# Patient Record
Sex: FEMALE | Race: WHITE | NOT HISPANIC OR LATINO | Employment: UNEMPLOYED | URBAN - METROPOLITAN AREA
[De-identification: names, ages, dates, MRNs, and addresses within clinical notes are randomized per-mention and may not be internally consistent; named-entity substitution may affect disease eponyms.]

---

## 2024-08-23 ENCOUNTER — HOSPITAL ENCOUNTER (EMERGENCY)
Facility: HOSPITAL | Age: 49
Discharge: HOME | End: 2024-08-23
Attending: STUDENT IN AN ORGANIZED HEALTH CARE EDUCATION/TRAINING PROGRAM
Payer: COMMERCIAL

## 2024-08-23 ENCOUNTER — APPOINTMENT (OUTPATIENT)
Dept: RADIOLOGY | Facility: HOSPITAL | Age: 49
End: 2024-08-23
Payer: COMMERCIAL

## 2024-08-23 ENCOUNTER — APPOINTMENT (OUTPATIENT)
Dept: CARDIOLOGY | Facility: HOSPITAL | Age: 49
End: 2024-08-23
Payer: COMMERCIAL

## 2024-08-23 VITALS
OXYGEN SATURATION: 96 % | RESPIRATION RATE: 16 BRPM | WEIGHT: 180 LBS | SYSTOLIC BLOOD PRESSURE: 112 MMHG | HEIGHT: 66 IN | BODY MASS INDEX: 28.93 KG/M2 | HEART RATE: 74 BPM | TEMPERATURE: 98.4 F | DIASTOLIC BLOOD PRESSURE: 67 MMHG

## 2024-08-23 DIAGNOSIS — R06.02 SHORTNESS OF BREATH: ICD-10-CM

## 2024-08-23 DIAGNOSIS — E04.1 THYROID NODULE: ICD-10-CM

## 2024-08-23 DIAGNOSIS — R00.2 PALPITATIONS: Primary | ICD-10-CM

## 2024-08-23 LAB
ALBUMIN SERPL BCP-MCNC: 4.4 G/DL (ref 3.4–5)
ALP SERPL-CCNC: 68 U/L (ref 33–110)
ALT SERPL W P-5'-P-CCNC: 15 U/L (ref 7–45)
ANION GAP SERPL CALC-SCNC: 14 MMOL/L (ref 10–20)
AST SERPL W P-5'-P-CCNC: 17 U/L (ref 9–39)
BASOPHILS # BLD AUTO: 0.03 X10*3/UL (ref 0–0.1)
BASOPHILS NFR BLD AUTO: 0.4 %
BILIRUB SERPL-MCNC: 1.1 MG/DL (ref 0–1.2)
BUN SERPL-MCNC: 18 MG/DL (ref 6–23)
CALCIUM SERPL-MCNC: 8.9 MG/DL (ref 8.6–10.3)
CARDIAC TROPONIN I PNL SERPL HS: <3 NG/L (ref 0–13)
CARDIAC TROPONIN I PNL SERPL HS: <3 NG/L (ref 0–13)
CHLORIDE SERPL-SCNC: 99 MMOL/L (ref 98–107)
CO2 SERPL-SCNC: 28 MMOL/L (ref 21–32)
CREAT SERPL-MCNC: 0.86 MG/DL (ref 0.5–1.05)
EGFRCR SERPLBLD CKD-EPI 2021: 83 ML/MIN/1.73M*2
EOSINOPHIL # BLD AUTO: 0.1 X10*3/UL (ref 0–0.7)
EOSINOPHIL NFR BLD AUTO: 1.4 %
ERYTHROCYTE [DISTWIDTH] IN BLOOD BY AUTOMATED COUNT: 11.9 % (ref 11.5–14.5)
GLUCOSE SERPL-MCNC: 125 MG/DL (ref 74–99)
HCT VFR BLD AUTO: 44.6 % (ref 36–46)
HGB BLD-MCNC: 14.8 G/DL (ref 12–16)
IMM GRANULOCYTES # BLD AUTO: 0.02 X10*3/UL (ref 0–0.7)
IMM GRANULOCYTES NFR BLD AUTO: 0.3 % (ref 0–0.9)
LIPASE SERPL-CCNC: 40 U/L (ref 9–82)
LYMPHOCYTES # BLD AUTO: 1.54 X10*3/UL (ref 1.2–4.8)
LYMPHOCYTES NFR BLD AUTO: 21.6 %
MAGNESIUM SERPL-MCNC: 1.9 MG/DL (ref 1.6–2.4)
MCH RBC QN AUTO: 30.5 PG (ref 26–34)
MCHC RBC AUTO-ENTMCNC: 33.2 G/DL (ref 32–36)
MCV RBC AUTO: 92 FL (ref 80–100)
MONOCYTES # BLD AUTO: 0.48 X10*3/UL (ref 0.1–1)
MONOCYTES NFR BLD AUTO: 6.7 %
NEUTROPHILS # BLD AUTO: 4.97 X10*3/UL (ref 1.2–7.7)
NEUTROPHILS NFR BLD AUTO: 69.6 %
NRBC BLD-RTO: 0 /100 WBCS (ref 0–0)
PLATELET # BLD AUTO: 221 X10*3/UL (ref 150–450)
POTASSIUM SERPL-SCNC: 3.7 MMOL/L (ref 3.5–5.3)
PROT SERPL-MCNC: 7.3 G/DL (ref 6.4–8.2)
RBC # BLD AUTO: 4.85 X10*6/UL (ref 4–5.2)
SODIUM SERPL-SCNC: 137 MMOL/L (ref 136–145)
TSH SERPL-ACNC: 3.01 MIU/L (ref 0.44–3.98)
WBC # BLD AUTO: 7.1 X10*3/UL (ref 4.4–11.3)

## 2024-08-23 PROCEDURE — 82947 ASSAY GLUCOSE BLOOD QUANT: CPT

## 2024-08-23 PROCEDURE — 83690 ASSAY OF LIPASE: CPT

## 2024-08-23 PROCEDURE — 2550000001 HC RX 255 CONTRASTS: Performed by: STUDENT IN AN ORGANIZED HEALTH CARE EDUCATION/TRAINING PROGRAM

## 2024-08-23 PROCEDURE — 71045 X-RAY EXAM CHEST 1 VIEW: CPT | Performed by: RADIOLOGY

## 2024-08-23 PROCEDURE — 71045 X-RAY EXAM CHEST 1 VIEW: CPT

## 2024-08-23 PROCEDURE — 93005 ELECTROCARDIOGRAM TRACING: CPT

## 2024-08-23 PROCEDURE — 84443 ASSAY THYROID STIM HORMONE: CPT

## 2024-08-23 PROCEDURE — 84484 ASSAY OF TROPONIN QUANT: CPT

## 2024-08-23 PROCEDURE — 99285 EMERGENCY DEPT VISIT HI MDM: CPT | Mod: 25

## 2024-08-23 PROCEDURE — 71275 CT ANGIOGRAPHY CHEST: CPT

## 2024-08-23 PROCEDURE — 71275 CT ANGIOGRAPHY CHEST: CPT | Performed by: RADIOLOGY

## 2024-08-23 PROCEDURE — 83735 ASSAY OF MAGNESIUM: CPT

## 2024-08-23 PROCEDURE — 85025 COMPLETE CBC W/AUTO DIFF WBC: CPT

## 2024-08-23 PROCEDURE — 36415 COLL VENOUS BLD VENIPUNCTURE: CPT

## 2024-08-23 RX ADMIN — IOHEXOL 50 ML: 350 INJECTION, SOLUTION INTRAVENOUS at 20:05

## 2024-08-23 ASSESSMENT — PAIN - FUNCTIONAL ASSESSMENT: PAIN_FUNCTIONAL_ASSESSMENT: 0-10

## 2024-08-23 ASSESSMENT — COLUMBIA-SUICIDE SEVERITY RATING SCALE - C-SSRS
1. IN THE PAST MONTH, HAVE YOU WISHED YOU WERE DEAD OR WISHED YOU COULD GO TO SLEEP AND NOT WAKE UP?: NO
2. HAVE YOU ACTUALLY HAD ANY THOUGHTS OF KILLING YOURSELF?: NO
6. HAVE YOU EVER DONE ANYTHING, STARTED TO DO ANYTHING, OR PREPARED TO DO ANYTHING TO END YOUR LIFE?: NO

## 2024-08-23 ASSESSMENT — PAIN DESCRIPTION - DESCRIPTORS: DESCRIPTORS: BURNING;PRESSURE

## 2024-08-24 NOTE — DISCHARGE INSTRUCTIONS
You are seen today due to concerns of palpitations with associated chest pressure and shortness of breath.  Your CT PE scan was negative and lab work looks unremarkable.  Please continue to monitor heart rate and follow-up with your PCP.

## 2024-08-24 NOTE — ED PROVIDER NOTES
CC: Chest Pain     HPI:   Patient is a previous healthy 49-year-old female presenting due to concerns of palpitations for the past few days.  Patient has not had any increase in caffeine intake does not have any thyroid problems that she is aware of.  She reports that her heart rate monitor has been showing sinus tachycardia with a rate between 110 and 130s consistently.  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  She denies any medications that she takes daily.  She does not smoke or drink.  She has no history of DVTs or PEs.  She feels shortness of breath associated with her palpitations and pressure on her chest bilaterally.  Patient has a heart rate in the 90s persistently overall initially being evaluated.  She is satting well on room air and is not tachypneic.    Limitations to History: none  Additional History Obtained from: patient alone    PMHx/PSHx:  Per HPI.   - has no past medical history on file.  - has a past surgical history that includes Other surgical history (03/06/2019); Other surgical history (03/06/2019); and Other surgical history (10/29/2019).    Social History:  - Tobacco:  has no history on file for tobacco use.   - Alcohol:  has no history on file for alcohol use.   - Drugs:  has no history on file for drug use.     Medications: Reviewed in EMR.     Allergies:  Penicillins    ???????????????????????????????????????????????????????????????  Triage Vitals:  T 36.9 °C (98.4 °F)  HR 93  /82  RR 18  O2 98 % None (Room air)    Physical Exam  Vitals and nursing note reviewed.   Constitutional:       General: She is not in acute distress.     Appearance: She is well-developed.   HENT:      Head: Normocephalic and atraumatic.      Mouth/Throat:      Mouth: Mucous membranes are dry.      Pharynx: Oropharynx is clear.   Eyes:      Conjunctiva/sclera: Conjunctivae normal.   Cardiovascular:      Rate and Rhythm: Regular rhythm. Tachycardia  present.      Heart sounds: No murmur heard.  Pulmonary:      Effort: Pulmonary effort is normal. No respiratory distress.      Breath sounds: Normal breath sounds. No wheezing, rhonchi or rales.   Abdominal:      General: There is no distension.      Palpations: Abdomen is soft.      Tenderness: There is no abdominal tenderness. There is no guarding or rebound.   Musculoskeletal:         General: No swelling or tenderness.      Cervical back: Neck supple.   Skin:     General: Skin is warm and dry.      Capillary Refill: Capillary refill takes less than 2 seconds.   Neurological:      Mental Status: She is alert and oriented to person, place, and time.      Sensory: No sensory deficit.      Motor: No weakness.      Gait: Gait normal.   Psychiatric:         Mood and Affect: Mood normal.       ???????????????????????????????????????????????????????????????  EKG (per my interpretation): Sinus rhythm with a rate of 81.  No MA QRS punctation.  No acute ST or T wave changes noted.  No NINOSKA.    ED Course  ED Course as of 08/23/24 2048   Fri Aug 23, 2024   1932 This is a 49-year-old presenting with chief complaint of chest pain, short of breath. [WL]   1932 EKG interpreted by me as normal sinus rhythm, low voltage QRS, nonselective abnormalities.  No STEMI.  Rate 81 bpm.  Compared to prior EKG similar findings present [WL]   2048 CT PE negative   [RR]      ED Course User Index  [RR] Bhavna Elizabeth MD  [WL] Sergio Marquez DO       Medical Decision Making:  Patient is a previously healthy 41-year-old female presenting due to concern for palpitations and shortness of breath.  Differentials considered but not limited to electrolyte abnormality, anemia, ACS,  PE, pneumothorax, thyroid malfunction, pancreatitis.    Based on patient's history and physical examination basic lab work, chest x-ray and CT PE were obtained.  Suspect patient's tachycardia is likely anxiety.  She will be referred to cardiology and PCP.  At this time  she is low risk and will be discharged.  Patient feels comfortable with this plan was discharged in hemodynamically stable condition.  Patient care was overseen by attending physician agrees with the plan and disposition.    External records reviewed: recent inpatient, clinic, and prior ED notes  Diagnostic imaging independently reviewed/interpreted by me (as reflected in MDM) includes: CT PE, CXR  Social Determinants Affecting Care: None identified  Discussion of management with other providers: attending  Prescription Drug Consideration: none  Escalation of Care: none    Impression:   Palpitations  Tachycardia  SOB    Disposition: Discharge      Procedures ? SmartLinks last updated 8/23/2024 8:48 PM     Bhavna Elizabeth  PGY-2 Emergency Medicine  [facility]     Bhavna Elizabeth MD  Resident  08/23/24 2042

## 2024-08-26 LAB
ATRIAL RATE: 81 BPM
P AXIS: 71 DEGREES
P OFFSET: 198 MS
P ONSET: 142 MS
PR INTERVAL: 156 MS
Q ONSET: 220 MS
QRS COUNT: 13 BEATS
QRS DURATION: 86 MS
QT INTERVAL: 380 MS
QTC CALCULATION(BAZETT): 441 MS
QTC FREDERICIA: 419 MS
R AXIS: 37 DEGREES
T AXIS: 73 DEGREES
T OFFSET: 410 MS
VENTRICULAR RATE: 81 BPM

## 2024-08-28 ENCOUNTER — OFFICE VISIT (OUTPATIENT)
Dept: CARDIOLOGY | Facility: HOSPITAL | Age: 49
End: 2024-08-28
Payer: COMMERCIAL

## 2024-08-28 VITALS
SYSTOLIC BLOOD PRESSURE: 105 MMHG | BODY MASS INDEX: 27.36 KG/M2 | WEIGHT: 169.53 LBS | DIASTOLIC BLOOD PRESSURE: 69 MMHG | OXYGEN SATURATION: 98 % | HEART RATE: 73 BPM

## 2024-08-28 DIAGNOSIS — I34.1 MITRAL VALVE ANTERIOR LEAFLET PROLAPSE: ICD-10-CM

## 2024-08-28 DIAGNOSIS — R00.2 PALPITATIONS: Primary | ICD-10-CM

## 2024-08-28 PROCEDURE — 99203 OFFICE O/P NEW LOW 30 MIN: CPT | Performed by: NURSE PRACTITIONER

## 2024-08-28 PROCEDURE — 99213 OFFICE O/P EST LOW 20 MIN: CPT | Performed by: NURSE PRACTITIONER

## 2024-08-28 PROCEDURE — 1036F TOBACCO NON-USER: CPT | Performed by: NURSE PRACTITIONER

## 2024-08-28 ASSESSMENT — ENCOUNTER SYMPTOMS
MYALGIAS: 1
EYES NEGATIVE: 1
HEMATOLOGIC/LYMPHATIC NEGATIVE: 1
ENDOCRINE NEGATIVE: 1
CONSTITUTIONAL NEGATIVE: 1
PSYCHIATRIC NEGATIVE: 1
PALPITATIONS: 1
GASTROINTESTINAL NEGATIVE: 1
RESPIRATORY NEGATIVE: 1
NEUROLOGICAL NEGATIVE: 1

## 2024-08-28 NOTE — PROGRESS NOTES
Referred by Dr. Marquez for Palpitations     History Of Present Illness:    Dear Dr. Guadalupe,     I had the pleasure of meeting Mrs. Shaw today at [facility] for evaluation of heart palpitations. The patient is seen in collaboration with Dr. Trore. Mrs. Shaw is a very pleasant 49 year old female without significant medical history, denies history of smoking. Family history of mitral valve prolapse. Recently in the ER with heart palpitations. She states her heart rate was elevated and was not coming back to normal. During that time had felt hot. She is under stress at home caring for her , states the stress has not changed. States she does not sleep well at night.     Review of Systems   Constitutional: Negative.   HENT: Negative.     Eyes: Negative.    Cardiovascular:  Positive for palpitations.   Respiratory: Negative.     Endocrine: Negative.    Hematologic/Lymphatic: Negative.    Skin: Negative.    Musculoskeletal:  Positive for myalgias.   Gastrointestinal: Negative.    Neurological: Negative.    Psychiatric/Behavioral: Negative.          Past Medical History:  She has no past medical history on file.    Past Surgical History:  She has a past surgical history that includes Other surgical history (03/06/2019); Other surgical history (03/06/2019); and Other surgical history (10/29/2019).      Social History:  She reports that she has never smoked. She has never used smokeless tobacco. She reports that she does not drink alcohol and does not use drugs.    Family History:  No family history on file.     Allergies:  Penicillins    Outpatient Medications:  No current outpatient medications     Last Recorded Vitals:  Vitals:    08/28/24 1342   BP: 105/69   Pulse: 73   SpO2: 98%   Weight: 76.9 kg (169 lb 8.5 oz)       Physical Exam:  Physical Exam  Vitals reviewed.   HENT:      Head: Normocephalic.      Nose: Nose normal.   Eyes:      Pupils: Pupils are equal, round, and reactive  to light.   Cardiovascular:      Rate and Rhythm: Normal rate and regular rhythm.   Pulmonary:      Effort: Pulmonary effort is normal.      Breath sounds: Normal breath sounds.   Abdominal:      General: Abdomen is flat.      Palpations: Abdomen is soft.   Musculoskeletal:         General: Normal range of motion.      Cervical back: Normal range of motion.   Skin:     General: Skin is warm and dry.   Neurological:      General: No focal deficit present.      Mental Status: He is alert and oriented to person, place, and time.   Psychiatric:         Mood and Affect: Mood normal.            Last Labs:  CBC -  Lab Results   Component Value Date    WBC 7.1 08/23/2024    HGB 14.8 08/23/2024    HCT 44.6 08/23/2024    MCV 92 08/23/2024     08/23/2024       CMP -  Lab Results   Component Value Date    CALCIUM 8.9 08/23/2024    PHOS 2.9 02/08/2019    PROT 7.3 08/23/2024    ALBUMIN 4.4 08/23/2024    AST 17 08/23/2024    ALT 15 08/23/2024    ALKPHOS 68 08/23/2024    BILITOT 1.1 08/23/2024       LIPID PANEL -   Lab Results   Component Value Date    CHOL 131 02/08/2019    TRIG 59 02/08/2019    HDL 40.4 02/08/2019    CHHDL 3.2 02/08/2019    LDLF 79 02/08/2019    VLDL 12 02/08/2019       RENAL FUNCTION PANEL -   Lab Results   Component Value Date    GLUCOSE 125 (H) 08/23/2024     08/23/2024    K 3.7 08/23/2024    CL 99 08/23/2024    CO2 28 08/23/2024    ANIONGAP 14 08/23/2024    BUN 18 08/23/2024    CREATININE 0.86 08/23/2024    CALCIUM 8.9 08/23/2024    PHOS 2.9 02/08/2019    ALBUMIN 4.4 08/23/2024        Lab Results   Component Value Date    BNP 21 02/07/2019    HGBA1C 4.8 01/26/2023       Last Cardiology Tests:  ECG:  ECG 12 lead 08/23/2024      Echo:  Echocardiogram 2/2/2022  1. The left ventricular systolic function is normal with a 55-60% estimated ejection fraction.   2. There is trace mitral and tricuspid regurgitation.       Echocardiogram 2/8/2019  1. The left ventricular systolic function is normal with a  60-65% estimated ejection fraction.   2. Spectral Doppler shows an impaired relaxation pattern of left ventricular diastolic filling.   3. 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 Mild mitral valve regurgitation.  Ejection Fractions:  LVEF 55-60%  Cath:    Stress Test:    Cardiac Imaging:      Assessment/Plan   Mrs. Shaw is a very pleasant 49 year old female with a history of mitral valve prolapse, recently in the ER with heart palpitations. She denies any recurrent heart palpitations. She will have an echocardiogram to evaluate her mitral valve and a heart monitor to evaluate for an arrhythmia. Heart rate and blood pressure are well controlled today    Plan   -call with any questions   -no medication changes   -heart monitor for two weeks   -echocardiogram   -will call to evaluate her LV function     I appreciate the opportunity to participate in the patient's care, please call with any questions         MARGOT Bray-CNP

## 2024-09-09 ENCOUNTER — TELEPHONE (OUTPATIENT)
Dept: CARDIOLOGY | Facility: HOSPITAL | Age: 49
End: 2024-09-09
Payer: COMMERCIAL

## 2024-09-23 ENCOUNTER — APPOINTMENT (OUTPATIENT)
Dept: CARDIOLOGY | Facility: HOSPITAL | Age: 49
End: 2024-09-23
Payer: COMMERCIAL

## 2024-09-24 ENCOUNTER — APPOINTMENT (OUTPATIENT)
Dept: CARDIOLOGY | Facility: HOSPITAL | Age: 49
End: 2024-09-24
Payer: COMMERCIAL

## 2024-09-30 ENCOUNTER — APPOINTMENT (OUTPATIENT)
Dept: CARDIOLOGY | Facility: HOSPITAL | Age: 49
End: 2024-09-30
Payer: COMMERCIAL

## 2024-10-07 ENCOUNTER — HOSPITAL ENCOUNTER (OUTPATIENT)
Dept: CARDIOLOGY | Facility: HOSPITAL | Age: 49
Discharge: HOME | End: 2024-10-07
Payer: COMMERCIAL

## 2024-10-07 DIAGNOSIS — R00.2 PALPITATIONS: ICD-10-CM

## 2024-10-07 PROCEDURE — 93246 EXT ECG>7D<15D RECORDING: CPT

## 2024-11-05 ENCOUNTER — TELEPHONE (OUTPATIENT)
Dept: CARDIOLOGY | Facility: HOSPITAL | Age: 49
End: 2024-11-05
Payer: COMMERCIAL

## 2024-11-05 NOTE — TELEPHONE ENCOUNTER
----- Message from Dorene Faulkner sent at 10/29/2024 10:23 AM EDT -----  Regarding: RE: abnormal zio  Monitor does have a wide range, It does not look bad but she does max out at 231 bpm   If she is still having heart palpitations, have her follow up with EP   Thanks  ----- Message -----  From: Omarshahnaz Ott  Sent: 10/28/2024  11:46 AM EDT  To: Dorene Faulkner, APRN-CNP  Subject: abnormal zio                                     Patient had a min HR of 44 bpm, max HR of 231 bpm, and avg HR of 73 bpm.  Predominant underlying rhythm was Sinus Rhythm. 1 run of Ventricular Tachycardia  occurred lasting 4 beats with a max rate of 231 bpm (avg 197 bpm). 1 run of  Supraventricular Tachycardia occurred lasting 20 beats with a max rate of 122 bpm  (avg 107 bpm). Episode of Supraventricular Tachycardia may be possible Atrial  Tachycardia with variable block. Isolated SVEs were rare (<1.0%), SVE Couplets  were rare (<1.0%), and no SVE Triplets were present. Isolated VEs were rare  (<1.0%), VE Couplets were rare (<1.0%), and no VE Triplets were present.  Ventricular Bigeminy and Trigeminy were present. MD notification criteria for  Ventricular Tachycardia met - notified Omar ODEN on 28 Oct 2024 at 09:26 am CT  (OF).

## 2024-12-02 ENCOUNTER — APPOINTMENT (OUTPATIENT)
Dept: CARDIOLOGY | Facility: HOSPITAL | Age: 49
End: 2024-12-02
Payer: COMMERCIAL

## 2024-12-08 NOTE — PROGRESS NOTES
[facility] and Vascular Electrophysiology    Patient Name: Hannah Shaw  Patient : 1975    Referred for  SVT    History of Present Illness:  Hannah Shaw is a 49 y.o. year old female patient with:    SVT : Abnormal Zio results    Patient was referred to EP by Dorene Darby for palpitations.  She went to the ER back in August with palpitations.  From the ER note, the patient was in sinus rhythm during the time she was there.  She has not been prescribed any AV olivia blocking agents.  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  She feels palpitations occasionally, but it does not last long.  Her event monitor from 2024 showed 1 episode of NSVT lasting 4 beats and 1 run of nonsustained SVT lasting 20 beats.  Her EKG today shows sinus rhythm with normal MO interval normal QRS duration normal QTc and a heart rate of 70 bpm.    Past Medical History:  She has no past medical history on file.    Past Surgical History:  She has a past surgical history that includes Other surgical history (2019); Other surgical history (2019); and Other surgical history (10/29/2019).      Social History:  She reports that she has never smoked. She has never used smokeless tobacco. She reports that she does not drink alcohol and does not use drugs.    Family History:  No family history on file.     Allergies:  Penicillins    Outpatient Medications:  No current outpatient medications     ROS:  A 14 point review of systems was done and is negative other than as stated in HPI    Physical Exam  Constitutional:       Appearance: Normal appearance.   Cardiovascular:      Rate and Rhythm: Normal rate and regular rhythm.      Heart sounds: No murmur heard.     No friction rub. No gallop.   Pulmonary:      Effort: Pulmonary effort is normal.      Breath sounds: Normal breath sounds.   Abdominal:      Palpations: Abdomen  is soft.   Musculoskeletal:      Cervical back: Neck supple.   Neurological:      Mental Status: She is alert.   Psychiatric:         Mood and Affect: Mood normal.         Behavior: Behavior normal.          Vitals:  There were no vitals taken for this visit.       Labs:   CBC  Lab Results   Component Value Date    WBC 7.1 08/23/2024    HGB 14.8 08/23/2024    HCT 44.6 08/23/2024    MCV 92 08/23/2024     08/23/2024        Renal Function Panel  Lab Results   Component Value Date    GLUCOSE 125 (H) 08/23/2024     08/23/2024    K 3.7 08/23/2024    CL 99 08/23/2024    CO2 28 08/23/2024    ANIONGAP 14 08/23/2024    BUN 18 08/23/2024    CREATININE 0.86 08/23/2024    CALCIUM 8.9 08/23/2024        CMP  Lab Results   Component Value Date    CALCIUM 8.9 08/23/2024    PHOS 2.9 02/08/2019    PROT 7.3 08/23/2024    ALBUMIN 4.4 08/23/2024    AST 17 08/23/2024    ALT 15 08/23/2024    ALKPHOS 68 08/23/2024    BILITOT 1.1 08/23/2024       TSH  Lab Results   Component Value Date    TSH 3.01 08/23/2024    FREET4 1.14 10/29/2019    T3FREE 2.9 10/29/2019          Cardiac Testing:  ECG  12/9/2024  sinus rhythm with normal ME interval normal QRS duration normal QTc and a heart rate of 70 bpm.    Echocardiogram  02/02/2022  PHYSICIAN INTERPRETATION:  Left Ventricle: The left ventricular systolic function is normal, with an estimated ejection fraction of 55-60%. There are no regional wall motion abnormalities. The left ventricular cavity size is normal. Spectral Doppler shows a normal pattern of left ventricular diastolic filling.  Left Atrium: The left atrium is normal in size.  Right Ventricle: The right ventricle is normal in size. There is normal right ventricular global systolic function.  Right Atrium: The right atrium is normal in size.  Aortic Valve: The aortic valve is trileaflet. There is no evidence of aortic valve stenosis.  There is no evidence of aortic valve regurgitation. The peak instantaneous gradient of the  aortic valve is 4.0 mmHg. The mean gradient of the aortic valve is 2.0 mmHg.  Mitral Valve: The mitral valve is normal in structure. There is trace mitral valve regurgitation.  Tricuspid Valve: The tricuspid valve is structurally normal. There is trace tricuspid regurgitation.  Pulmonic Valve: The pulmonic valve is structurally normal. There is no indication of pulmonic valve regurgitation.  Pericardium: There is no pericardial effusion noted.  Aorta: The aortic root is normal.  Pulmonary Artery: The tricuspid regurgitant velocity is 2.06 m/s, and with an estimated right atrial pressure of 10 mmHg, the estimated pulmonary artery pressure is normal with the RVSP at 27.0 mmHg.  Systemic Veins: The inferior vena cava appears to be of normal size.        CONCLUSIONS:   1. The left ventricular systolic function is normal with a 55-60% estimated ejection fraction.   2. There is trace mitral and tricuspid regurgitation.      [employer] Heart Monitor 10/07/2024-10/21/2024  Patient had a min HR of 44 bpm, max HR of 231 bpm, and avg HR of 73 bpm.  Predominant underlying rhythm was Sinus Rhythm. 1 run of Ventricular Tachycardia  occurred lasting 4 beats with a max rate of 231 bpm (avg 197 bpm). 1 run of  Supraventricular Tachycardia occurred lasting 20 beats with a max rate of 122 bpm  (avg 107 bpm). Episode of Supraventricular Tachycardia may be possible Atrial  Tachycardia with variable block. Isolated SVEs were rare (<1.0%), SVE Couplets  were rare (<1.0%), and no SVE Triplets were present. Isolated VEs were rare  (<1.0%), VE Couplets were rare (<1.0%), and no VE Triplets were present.  Ventricular Bigeminy and Trigeminy were present.      Assessment:   She states that on August 23 she felt symptoms of fast heart rates and associated heaviness in her chest and shortness of breath and she went to the ER.  This lasted about half an hour according to her.  From the ER note, the patient was in sinus rhythm during the time she was  there.  She has not been prescribed any AV olivia blocking agents.  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  She feels palpitations occasionally, but it does not last long.  Her event monitor from August 2024 showed 1 episode of NSVT lasting 4 beats and 1 run of nonsustained SVT lasting 20 beats.  Her EKG today shows sinus rhythm with normal LA interval normal QRS duration normal QTc and a heart rate of 70 bpm.    Plan:  I discussed the findings with the patient and discussed about starting her on a beta-blocker.  The patient declined.  Will order an echocardiogram and a nuclear stress test.  Depending on results, the patient is willing to start a beta-blocker.

## 2024-12-09 ENCOUNTER — OFFICE VISIT (OUTPATIENT)
Dept: CARDIOLOGY | Facility: HOSPITAL | Age: 49
End: 2024-12-09
Payer: COMMERCIAL

## 2024-12-09 VITALS
HEART RATE: 66 BPM | BODY MASS INDEX: 27.33 KG/M2 | DIASTOLIC BLOOD PRESSURE: 73 MMHG | OXYGEN SATURATION: 99 % | WEIGHT: 169.31 LBS | SYSTOLIC BLOOD PRESSURE: 107 MMHG

## 2024-12-09 DIAGNOSIS — R07.89 OTHER CHEST PAIN: Primary | ICD-10-CM

## 2024-12-09 DIAGNOSIS — R06.02 SHORTNESS OF BREATH: ICD-10-CM

## 2024-12-09 DIAGNOSIS — R00.2 PALPITATIONS: ICD-10-CM

## 2024-12-09 LAB
ATRIAL RATE: 70 BPM
P AXIS: 75 DEGREES
P OFFSET: 195 MS
P ONSET: 150 MS
PR INTERVAL: 142 MS
Q ONSET: 221 MS
QRS COUNT: 11 BEATS
QRS DURATION: 80 MS
QT INTERVAL: 396 MS
QTC CALCULATION(BAZETT): 427 MS
QTC FREDERICIA: 417 MS
R AXIS: 50 DEGREES
T AXIS: 61 DEGREES
T OFFSET: 419 MS
VENTRICULAR RATE: 70 BPM

## 2024-12-09 PROCEDURE — 93005 ELECTROCARDIOGRAM TRACING: CPT | Performed by: STUDENT IN AN ORGANIZED HEALTH CARE EDUCATION/TRAINING PROGRAM

## 2024-12-09 PROCEDURE — 99214 OFFICE O/P EST MOD 30 MIN: CPT | Performed by: STUDENT IN AN ORGANIZED HEALTH CARE EDUCATION/TRAINING PROGRAM

## 2025-01-13 ENCOUNTER — APPOINTMENT (OUTPATIENT)
Dept: RADIOLOGY | Facility: HOSPITAL | Age: 50
End: 2025-01-13
Payer: COMMERCIAL

## 2025-01-13 ENCOUNTER — APPOINTMENT (OUTPATIENT)
Dept: CARDIOLOGY | Facility: HOSPITAL | Age: 50
End: 2025-01-13
Payer: COMMERCIAL

## 2025-01-13 ENCOUNTER — HOSPITAL ENCOUNTER (OUTPATIENT)
Dept: CARDIOLOGY | Facility: HOSPITAL | Age: 50
Discharge: HOME | End: 2025-01-13
Payer: COMMERCIAL

## 2025-01-13 DIAGNOSIS — R07.89 OTHER CHEST PAIN: ICD-10-CM

## 2025-01-13 DIAGNOSIS — R00.2 PALPITATIONS: ICD-10-CM

## 2025-01-13 DIAGNOSIS — R06.02 SHORTNESS OF BREATH: ICD-10-CM

## 2025-01-13 LAB
AORTIC VALVE MEAN GRADIENT: 3 MMHG
AORTIC VALVE PEAK VELOCITY: 1.06 M/S
AV PEAK GRADIENT: 4 MMHG
AVA (PEAK VEL): 2.27 CM2
AVA (VTI): 1.98 CM2
EJECTION FRACTION APICAL 4 CHAMBER: 60.5
EJECTION FRACTION: 63 %
LEFT ATRIUM VOLUME AREA LENGTH INDEX BSA: 21.6 ML/M2
LEFT VENTRICLE INTERNAL DIMENSION DIASTOLE: 4.49 CM (ref 3.5–6)
LEFT VENTRICULAR OUTFLOW TRACT DIAMETER: 1.96 CM
MITRAL VALVE E/A RATIO: 1.11
RIGHT VENTRICLE FREE WALL PEAK S': 15 CM/S
RIGHT VENTRICLE PEAK SYSTOLIC PRESSURE: 16.8 MMHG
TRICUSPID ANNULAR PLANE SYSTOLIC EXCURSION: 2.6 CM

## 2025-01-13 PROCEDURE — 93306 TTE W/DOPPLER COMPLETE: CPT

## 2025-01-13 PROCEDURE — 93306 TTE W/DOPPLER COMPLETE: CPT | Performed by: STUDENT IN AN ORGANIZED HEALTH CARE EDUCATION/TRAINING PROGRAM

## 2025-01-17 NOTE — PROGRESS NOTES
[facility] Heart and Vascular Electrophysiology    Patient Name: Hannah Shaw  Patient : 1975    Referred for  NSVT    History of Present Illness:  Hannah Shaw is a 49 y.o. year old female patient with:    NSVT : Her event monitor from 2024 showed 1 episode of NSVT lasting 4 beats and 1 run of nonsustained SVT lasting 20 beats.   Echo results show mild mitral valve prolapse and trace to mild mitral regurgitation.  LVEF is 60-65%.  Pt is scheduled for a stress test 2025.    Patient was referred to EP by Dorene Darby for palpitations.  She went to the ER back in August with palpitations.  From the ER note, the patient was in sinus rhythm during the time she was there.  She has not been prescribed any AV olivia blocking agents.  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  She feels palpitations occasionally, but it does not last long.  Her event monitor from 2024 showed 1 episode of NSVT lasting 4 beats and 1 run of nonsustained SVT lasting 20 beats.  She comes in today for a follow up. Her echo shows normal LVEF, mild MR/MV prolapse.     Past Medical History:  She has no past medical history on file.    Past Surgical History:  She has a past surgical history that includes Other surgical history (2019); Other surgical history (2019); and Other surgical history (10/29/2019).      Social History:  She reports that she has never smoked. She has never used smokeless tobacco. She reports that she does not drink alcohol and does not use drugs.    Family History:  No family history on file.     Allergies:  Penicillins    Outpatient Medications:  No current outpatient medications     ROS:  A 14 point review of systems was done and is negative other than as stated in HPI    Physical Exam  Constitutional:       Appearance: Normal appearance.   Cardiovascular:      Rate and Rhythm: Normal  rate and regular rhythm.      Heart sounds: No murmur heard.     No friction rub. No gallop.   Pulmonary:      Effort: Pulmonary effort is normal.      Breath sounds: Normal breath sounds.   Abdominal:      Palpations: Abdomen is soft.   Musculoskeletal:      Cervical back: Neck supple.   Neurological:      Mental Status: She is alert.   Psychiatric:         Mood and Affect: Mood normal.         Behavior: Behavior normal.          Vitals:  There were no vitals taken for this visit.       Labs:   CBC  Lab Results   Component Value Date    WBC 7.1 08/23/2024    HGB 14.8 08/23/2024    HCT 44.6 08/23/2024    MCV 92 08/23/2024     08/23/2024        Renal Function Panel  Lab Results   Component Value Date    GLUCOSE 125 (H) 08/23/2024     08/23/2024    K 3.7 08/23/2024    CL 99 08/23/2024    CO2 28 08/23/2024    ANIONGAP 14 08/23/2024    BUN 18 08/23/2024    CREATININE 0.86 08/23/2024    CALCIUM 8.9 08/23/2024        CMP  Lab Results   Component Value Date    CALCIUM 8.9 08/23/2024    PHOS 2.9 02/08/2019    PROT 7.3 08/23/2024    ALBUMIN 4.4 08/23/2024    AST 17 08/23/2024    ALT 15 08/23/2024    ALKPHOS 68 08/23/2024    BILITOT 1.1 08/23/2024       TSH  Lab Results   Component Value Date    TSH 3.01 08/23/2024    FREET4 1.14 10/29/2019    T3FREE 2.9 10/29/2019          Cardiac Testing:    Echocardiogram  01/13/2025  PHYSICIAN INTERPRETATION:  Left Ventricle: The left ventricular systolic function is normal, with a visually estimated ejection fraction of 60-65%. There are no regional left ventricular wall motion abnormalities. The left ventricular cavity size is normal. There is normal septal and normal posterior left ventricular wall thickness. There is excessive respiratory change in the motion of the interventricular septum-consider tamponade, ventilation related, etc. Spectral Doppler shows a normal pattern of left ventricular diastolic filling.  Left Atrium: The left atrium is normal in size. There is a  mobile interatrial septum.  Right Ventricle: The right ventricle is normal in size. There is normal right ventricular global systolic function.  Right Atrium: The right atrium is normal in size.  Aortic Valve: The aortic valve is trileaflet. The aortic valve dimensionless index is 0.66. There is no evidence of aortic valve regurgitation. The peak instantaneous gradient of the aortic valve is 4 mmHg. The mean gradient of the aortic valve is 3 mmHg.  Mitral Valve: The mitral valve is mildly thickened. There is mitral valve prolapse. There is mild mitral valve prolapse of the anterior and posterior leaflets. There is trace to mild mitral valve regurgitation.  Tricuspid Valve: The tricuspid valve is structurally normal. There is trace tricuspid regurgitation.  Pulmonic Valve: The pulmonic valve is structurally normal. There is trace to mild pulmonic valve regurgitation.  Pericardium: Trivial pericardial effusion.  Aorta: The aortic root is normal.  In comparison to the previous echocardiogram(s): Compared with study dated 2/2/2022, no significant change.        CONCLUSIONS:   1. The left ventricular systolic function is normal, with a visually estimated ejection fraction of 60-65%   2.   3. There is normal right ventricular global systolic function.   4. There is mild mitral valve prolapse. There is trace to mild mitral regurgitation.      Assessment:     Patient states that on August 23 she felt symptoms of fast heart rates and associated heaviness in her chest and shortness of breath and she went to the ER.  This lasted about half an hour according to her.  From the ER note, the patient was in sinus rhythm during the time she was there.  She has not been prescribed any AV olivia blocking agents.  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  She feels palpitations occasionally, but it does not last long.  Her event monitor from  August 2024 showed 1 episode of NSVT lasting 4 beats and 1 run of nonsustained SVT lasting 20 beats.     Plan:  Nuclear stress test scheduled. Will schedule a follow up to go over the results and discuss the possibility of starting medical therapy for NSSVT.

## 2025-01-20 ENCOUNTER — APPOINTMENT (OUTPATIENT)
Dept: CARDIOLOGY | Facility: HOSPITAL | Age: 50
End: 2025-01-20
Payer: COMMERCIAL

## 2025-01-20 ENCOUNTER — OFFICE VISIT (OUTPATIENT)
Dept: CARDIOLOGY | Facility: HOSPITAL | Age: 50
End: 2025-01-20
Payer: COMMERCIAL

## 2025-01-20 VITALS
SYSTOLIC BLOOD PRESSURE: 115 MMHG | DIASTOLIC BLOOD PRESSURE: 73 MMHG | HEART RATE: 81 BPM | OXYGEN SATURATION: 100 % | BODY MASS INDEX: 27.65 KG/M2 | WEIGHT: 171.3 LBS

## 2025-01-20 DIAGNOSIS — I34.1 MITRAL VALVE ANTERIOR LEAFLET PROLAPSE: ICD-10-CM

## 2025-01-20 DIAGNOSIS — R07.89 OTHER CHEST PAIN: ICD-10-CM

## 2025-01-20 DIAGNOSIS — R00.2 PALPITATIONS: Primary | ICD-10-CM

## 2025-01-20 DIAGNOSIS — I47.10 NONSUSTAINED SUPRAVENTRICULAR TACHYCARDIA (CMS-HCC): ICD-10-CM

## 2025-01-20 DIAGNOSIS — R06.02 SHORTNESS OF BREATH: ICD-10-CM

## 2025-01-20 PROCEDURE — 99214 OFFICE O/P EST MOD 30 MIN: CPT | Performed by: STUDENT IN AN ORGANIZED HEALTH CARE EDUCATION/TRAINING PROGRAM

## 2025-01-20 PROCEDURE — 1036F TOBACCO NON-USER: CPT | Performed by: STUDENT IN AN ORGANIZED HEALTH CARE EDUCATION/TRAINING PROGRAM

## 2025-01-21 DIAGNOSIS — R00.2 PALPITATIONS: ICD-10-CM

## 2025-01-22 ENCOUNTER — TELEPHONE (OUTPATIENT)
Dept: CARDIOLOGY | Facility: HOSPITAL | Age: 50
End: 2025-01-22
Payer: COMMERCIAL

## 2025-01-27 ENCOUNTER — APPOINTMENT (OUTPATIENT)
Dept: RADIOLOGY | Facility: HOSPITAL | Age: 50
End: 2025-01-27
Payer: COMMERCIAL

## 2025-01-27 ENCOUNTER — APPOINTMENT (OUTPATIENT)
Dept: CARDIOLOGY | Facility: HOSPITAL | Age: 50
End: 2025-01-27
Payer: COMMERCIAL

## 2025-02-25 ENCOUNTER — HOSPITAL ENCOUNTER (OUTPATIENT)
Dept: RADIOLOGY | Facility: HOSPITAL | Age: 50
Discharge: HOME | End: 2025-02-25
Payer: COMMERCIAL

## 2025-02-25 DIAGNOSIS — R00.2 PALPITATIONS: ICD-10-CM

## 2025-02-25 PROCEDURE — 75571 CT HRT W/O DYE W/CA TEST: CPT

## 2025-03-03 NOTE — PROGRESS NOTES
[facility] Heart and Vascular Electrophysiology    Patient Name: Hannah Shaw  Patient : 1975    Referred for  NSVT    History of Present Illness:  Hannah Shaw is a 49 y.o. year old female patient with:    NSVT : Her event monitor from 2024 showed 1 episode of NSVT lasting 4 beats and 1 run of nonsustained SVT lasting 20 beats.   Echo results show mild mitral valve prolapse and trace to mild mitral regurgitation.  LVEF is 60-65%.  Pt has CT Calcium score of zero.    Patient was referred to EP by Dorene Darby for palpitations.  She went to the ER back in August with palpitations.  From the ER note, the patient was in sinus rhythm during the time she was there.  She has not been prescribed any AV olivia blocking agents.  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  She feels palpitations occasionally, but it does not last long.  Her event monitor from 2024 showed 1 episode of NSVT lasting 4 beats and 1 run of nonsustained SVT lasting 20 beats.  Her echo shows normal LVEF, mild MR/MV prolapse. She comes in today for a follow up. Pt had CT with calcium score of zero and small pericardial effusion.    Past Medical History:  She has no past medical history on file.    Past Surgical History:  She has a past surgical history that includes Other surgical history (2019); Other surgical history (2019); and Other surgical history (10/29/2019).      Social History:  She reports that she has never smoked. She has never used smokeless tobacco. She reports that she does not drink alcohol and does not use drugs.    Family History:  No family history on file.     Allergies:  Penicillins    Outpatient Medications:  No current outpatient medications     ROS:  A 14 point review of systems was done and is negative other than as stated in HPI    Physical Exam  Constitutional:       Appearance: Normal  appearance.   Cardiovascular:      Rate and Rhythm: Normal rate and regular rhythm.      Heart sounds: No murmur heard.     No friction rub. No gallop.   Pulmonary:      Effort: Pulmonary effort is normal.      Breath sounds: Normal breath sounds.   Abdominal:      Palpations: Abdomen is soft.   Musculoskeletal:      Cervical back: Neck supple.   Neurological:      Mental Status: She is alert.   Psychiatric:         Mood and Affect: Mood normal.         Behavior: Behavior normal.         Vitals:  There were no vitals taken for this visit.       Labs:   CBC  Lab Results   Component Value Date    WBC 7.1 08/23/2024    HGB 14.8 08/23/2024    HCT 44.6 08/23/2024    MCV 92 08/23/2024     08/23/2024        Renal Function Panel  Lab Results   Component Value Date    GLUCOSE 125 (H) 08/23/2024     08/23/2024    K 3.7 08/23/2024    CL 99 08/23/2024    CO2 28 08/23/2024    ANIONGAP 14 08/23/2024    BUN 18 08/23/2024    CREATININE 0.86 08/23/2024    CALCIUM 8.9 08/23/2024        CMP  Lab Results   Component Value Date    CALCIUM 8.9 08/23/2024    PHOS 2.9 02/08/2019    PROT 7.3 08/23/2024    ALBUMIN 4.4 08/23/2024    AST 17 08/23/2024    ALT 15 08/23/2024    ALKPHOS 68 08/23/2024    BILITOT 1.1 08/23/2024       TSH  Lab Results   Component Value Date    TSH 3.01 08/23/2024    FREET4 1.14 10/29/2019    T3FREE 2.9 10/29/2019          Cardiac Testing:    Echocardiogram  01/13/2025  PHYSICIAN INTERPRETATION:  Left Ventricle: The left ventricular systolic function is normal, with a visually estimated ejection fraction of 60-65%. There are no regional left ventricular wall motion abnormalities. The left ventricular cavity size is normal. There is normal septal and normal posterior left ventricular wall thickness. There is excessive respiratory change in the motion of the interventricular septum-consider tamponade, ventilation related, etc. Spectral Doppler shows a normal pattern of left ventricular diastolic  filling.  Left Atrium: The left atrium is normal in size. There is a mobile interatrial septum.  Right Ventricle: The right ventricle is normal in size. There is normal right ventricular global systolic function.  Right Atrium: The right atrium is normal in size.  Aortic Valve: The aortic valve is trileaflet. The aortic valve dimensionless index is 0.66. There is no evidence of aortic valve regurgitation. The peak instantaneous gradient of the aortic valve is 4 mmHg. The mean gradient of the aortic valve is 3 mmHg.  Mitral Valve: The mitral valve is mildly thickened. There is mitral valve prolapse. There is mild mitral valve prolapse of the anterior and posterior leaflets. There is trace to mild mitral valve regurgitation.  Tricuspid Valve: The tricuspid valve is structurally normal. There is trace tricuspid regurgitation.  Pulmonic Valve: The pulmonic valve is structurally normal. There is trace to mild pulmonic valve regurgitation.  Pericardium: Trivial pericardial effusion.  Aorta: The aortic root is normal.  In comparison to the previous echocardiogram(s): Compared with study dated 2/2/2022, no significant change.        CONCLUSIONS:   1. The left ventricular systolic function is normal, with a visually estimated ejection fraction of 60-65%   2.   3. There is normal right ventricular global systolic function.   4. There is mild mitral valve prolapse. There is trace to mild mitral regurgitation.      CT Calcium Score  02/25/2025  FINDINGS:  The score and distribution of calcium in the coronary arteries is as  follows:      LM 0,  LAD 0,  LCx 0,  RCA 0,      Total   0    IMPRESSION:  1. Coronary artery calcium score of 0 *.  2. Small pericardial effusion; correlate clinically.      [employer] Cardiac Monitor  10/07/2024-10/21/2024  Patient had a min HR of 44 bpm, max HR of 231 bpm, and avg HR of 73 bpm.  Predominant underlying rhythm was Sinus Rhythm. 1 run of Ventricular Tachycardia  occurred lasting 4 beats with a max  rate of 231 bpm (avg 197 bpm). 1 run of  Supraventricular Tachycardia occurred lasting 20 beats with a max rate of 122 bpm  (avg 107 bpm). Episode of Supraventricular Tachycardia may be possible Atrial  Tachycardia with variable block. Isolated SVEs were rare (<1.0%), SVE Couplets  were rare (<1.0%), and no SVE Triplets were present. Isolated VEs were rare  (<1.0%), VE Couplets were rare (<1.0%), and no VE Triplets were present.  Ventricular Bigeminy and Trigeminy were present.      Assessment/ Plan:    Patient states that on August 23 she felt symptoms of fast heart rates and associated heaviness in her chest and shortness of breath and she went to the ER.  This lasted about half an hour according to her.  From the ER note, the patient was in sinus rhythm during the time she was there.  She has not been prescribed any AV olivia blocking agents.  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  She feels palpitations occasionally, but it does not last long.  Her event monitor from August 2024 showed 1 episode of NSVT lasting 4 beats and 1 run of nonsustained SVT lasting 20 beats. Her echo shows normal LVEF, mild MR/MV prolapse. She comes in today for a follow up. Pt had CT with calcium score of zero and small pericardial effusion.   Patient is feeling much better after having been started on supplements, prefers not to start a medication. Will follow as needed.

## 2025-03-10 ENCOUNTER — OFFICE VISIT (OUTPATIENT)
Dept: CARDIOLOGY | Facility: HOSPITAL | Age: 50
End: 2025-03-10
Payer: COMMERCIAL

## 2025-03-10 VITALS
SYSTOLIC BLOOD PRESSURE: 112 MMHG | WEIGHT: 157.41 LBS | DIASTOLIC BLOOD PRESSURE: 75 MMHG | HEART RATE: 80 BPM | OXYGEN SATURATION: 97 % | BODY MASS INDEX: 25.41 KG/M2

## 2025-03-10 DIAGNOSIS — I47.10 NONSUSTAINED SUPRAVENTRICULAR TACHYCARDIA (CMS-HCC): ICD-10-CM

## 2025-03-10 DIAGNOSIS — R00.2 PALPITATIONS: Primary | ICD-10-CM

## 2025-03-10 PROCEDURE — 1036F TOBACCO NON-USER: CPT | Performed by: STUDENT IN AN ORGANIZED HEALTH CARE EDUCATION/TRAINING PROGRAM

## 2025-03-10 PROCEDURE — 99214 OFFICE O/P EST MOD 30 MIN: CPT | Performed by: STUDENT IN AN ORGANIZED HEALTH CARE EDUCATION/TRAINING PROGRAM
